# Patient Record
(demographics unavailable — no encounter records)

---

## 2025-02-11 NOTE — PHYSICAL EXAM
[Obese, well nourished, in no acute distress] : obese, well nourished, in no acute distress [Normal] : affect appropriate [de-identified] : obese, soft, nontender, no evidence of hernia or diastases

## 2025-02-11 NOTE — ASSESSMENT
[FreeTextEntry1] : 24 year old F with a longstanding history of obesity presents for initial consultation for weight-loss management.  I had a lengthy discussion with the patient regarding nutrition, exercise, weight loss medications, and bariatric surgery. The patient does not qualify for bariatric surgery, We discuss how bariatric surgery may offer greater weight loss results with better long-term success. Patient qualifies for and is currently most interested in weight loss medications.   Health maintenance and behavioral/nutrition counseling for obesity: An additional 30 minutes of the visit was spent counseling the patient regarding need for aggressive weight loss and behavior modification including nutrition and proper eating habits, including which foods to eat and avoid.   I emphasized the importance of making healthier food choices including fresh fruits and vegetables, lean meats, and protein sources. I recommended front loading calories, incorporating whole grains, and eliminating fast foods. I also discussed the importance of avoiding fried/fatty foods and foods containing high sugar content including juices/shakes/sodas/desserts. Preprinted protein source information list given to the patient.   I also encouraged beginning an exercise program and recommended cardiovascular exercises along with strength training to build lean muscle. I made suggestions on different types of exercises to try.   Patient will work on the following: -Patient seen by nutritionist Bianca Simpson at today's visit, 15 minutes -Focus on eating 3 well-balanced meals during the daytime with appropriate portion size -Increase zero-calorie liquids 64 ounces a day -Cooking fresh meals rather than take out/processed/ready-made foods -Incorporating exercise; walk 8-10k steps daily and strength training -Obtain recent bloodwork   I have discussed initiating Zepbound therapy for pt. All risks and benefits have been discussed with the patient. Currently there are no contraindications for the use of ZEPBOUND after reviewing pts medical history and labs including personal or family history of thyroid cancer or MEN2. Possible side effects were discussed with the patient including nausea, reflux, constipation, delayed gastric emptying, gallstones, kidney stones or pancreatitis.  Decreased efficacy of oral contraceptives was also discussed with Zepbound.   Pt verbalizes understanding and wishes to proceed with medical therapy. Start Zepbound based on labs and authorization. Patient educated to call with questions/concerns. All questions answered.   Return to office 1 month after starting Zepbound    Discussed with the pt of the warnings of pregnancy while on Zepbound: -Patient not currently sexually active -Explained weight loss can increase fertility - instructed pt to avoid planned pregnancy and use of contraception - advised pt to stop immediately if becomes pregnant Pt verbalized understanding of the above   Additional time spent before and after visit reviewing chart.

## 2025-02-11 NOTE — HISTORY OF PRESENT ILLNESS
[de-identified] : 24 year old F with a longstanding history of obesity presents for initial consultation for weight-loss management. Pt feels that she eats healthy and goes to the gym but is unable to lose weight has history of PCOS.  Is now more frequent but not totally regular.  Pt has tried various diets and exercise programs with limited long-term success.  Pt is interested in starting weight loss medications.   Heaviest/current wt185/183 lbs, Goal weight: 130 lbs Diets/exercise programs tried in the past:   Current dietary lifestyle Breakfast: protein shake(skipped breakfast in the past) Lunch: Protein bar, Greek yogurt, yogurt shake Dinner: Protein, veggies, rice Snacks: not snacker Drinks: water 40 ounces, 1 coffee with milk   Activity Lifestyle Sleep: 6-8 hours Physical activity/exercise: walks and goes to gym Work: teacher Smoking/ETOH: no   Females of Childbearing Age Plans for future pregnancy: no plans Form(s) of Contraception: not currently sexually active    Last Colonoscopy: n/a Last Mammogram: n/a Last Pap Smear: n/a    Weight Loss Medication Screening: Reports no glaucoma, history of eating disorder, anxiety, substance abuse, uncontrolled blood pressure, kidney stones, or pancreatitis. Reports no family history thyroid cancer or MEN 2 syndrome. History of bariatric surgery: no Obesity comorbidities: none Comorbidities improved/resolved: n/a Anti-obesity medication: none Obesity medication side effects: n/a

## 2025-03-27 NOTE — PHYSICAL EXAM
[Obese, well nourished, in no acute distress] : obese, well nourished, in no acute distress [Normal] : affect appropriate [de-identified] : Equal chest rise, non-labored respirations, no audible wheezing.

## 2025-03-27 NOTE — HISTORY OF PRESENT ILLNESS
[de-identified] : 24 year old F with a longstanding history of obesity presents for a weight-loss medication follow-up after having made nutrition and exercise changes since last visit. Weight stable since last visit. Pt started Zepbound 2.5 mg on 3/8/25. Reports she does not feel any appetite suppression. Reports no abdominal pain, nausea/vomiting, constipation, diarrhea, reflux/heartburn.   Patient trying to eat 3 protein-rich meals/day, some snacking on bananas and oranges, drinking adequate zero-calorie fluids/day, and exercising by walking and cycling 3-4x per week; sleeps 6-8 hrs/night. Pt is interested in increasing dosage of medication   Starting weight at initial consult: 183 lbs Current weight at today's visit: 183 lbs   Anti-obesity medication(s): Zepbound Start date: 3/8/25 Current dose: 2.5 mg Side-effects from anti-obesity medication(s): none Obesity comorbidities: PCOS Comorbidities improved/resolved: n/a History of bariatric surgery: no

## 2025-03-27 NOTE — PHYSICAL EXAM
[Obese, well nourished, in no acute distress] : obese, well nourished, in no acute distress [Normal] : affect appropriate [de-identified] : Equal chest rise, non-labored respirations, no audible wheezing.

## 2025-03-27 NOTE — ASSESSMENT
[FreeTextEntry1] : 24 year old F with a longstanding history of obesity presents for a weight-loss medication follow-up after having made nutrition and exercise changes since last visit. Weight stable since last visit. Pt started Zepbound 2.5 mg on 3/8/25. Reports she does not feel any appetite suppression.   Last blood work 2/2025 Next blood work 8/2025   Had a lengthy discussion with the patient regarding nutrition, exercise, weight loss medications.   Patient will work on the following: -Meet with nutritionist and follow up with nutrition classes -Eliminate snacks -Increase zero calorie fluid intake to at least 64 oz/day -Focus on eating 3 well-balanced meals during the daytime with appropriate portion size -Cooking fresh meals rather than take out/processed/ready-made foods -Incorporating exercise; walk 8-10k steps daily -Increase dosage of Zepbound to 5mg/week   Discussed with the pt of the warnings of pregnancy while on Zepbound. Pt reports she is not sexually active. Discussed using condoms if she does become sexually active and that Zepbound decreases the efficacy of OCPs.  - instructed pt to avoid planned pregnancy and use of contraception - advised pt to stop immediately if becomes pregnant   Pt verbalized understanding of the above Pt will call office immediately for any side effects. Pt verbalizes understanding and wishes to proceed with medical therapy. Patient educated to call with questions/concerns. All questions answered.   Return to office 6-8 weeks or sooner if needed   Additional time spent before and after visit reviewing chart.

## 2025-03-27 NOTE — HISTORY OF PRESENT ILLNESS
[de-identified] : 24 year old F with a longstanding history of obesity presents for a weight-loss medication follow-up after having made nutrition and exercise changes since last visit. Weight stable since last visit. Pt started Zepbound 2.5 mg on 3/8/25. Reports she does not feel any appetite suppression. Reports no abdominal pain, nausea/vomiting, constipation, diarrhea, reflux/heartburn.   Patient trying to eat 3 protein-rich meals/day, some snacking on bananas and oranges, drinking adequate zero-calorie fluids/day, and exercising by walking and cycling 3-4x per week; sleeps 6-8 hrs/night. Pt is interested in increasing dosage of medication   Starting weight at initial consult: 183 lbs Current weight at today's visit: 183 lbs   Anti-obesity medication(s): Zepbound Start date: 3/8/25 Current dose: 2.5 mg Side-effects from anti-obesity medication(s): none Obesity comorbidities: PCOS Comorbidities improved/resolved: n/a History of bariatric surgery: no

## 2025-06-03 NOTE — PHYSICAL EXAM
[Obese, well nourished, in no acute distress] : obese, well nourished, in no acute distress [Normal] : affect appropriate [de-identified] : Equal chest rise, non-labored respirations, no audible wheezing.

## 2025-06-03 NOTE — PHYSICAL EXAM
[Obese, well nourished, in no acute distress] : obese, well nourished, in no acute distress [Normal] : affect appropriate [de-identified] : Equal chest rise, non-labored respirations, no audible wheezing.

## 2025-06-03 NOTE — HISTORY OF PRESENT ILLNESS
[de-identified] : 25 year old F with a longstanding history of obesity presents for a weight-loss medication follow-up after having made nutrition and exercise changes since last visit. Weight loss 11 lbs since last visit; currently on Zepbound 5mg. Reports she does not feel much appetite suppression and requesting to increase dose. Reports no abdominal pain, nausea/vomiting, constipation, diarrhea, reflux/heartburn, vision changes, mood changes. Pt reports her menstrual cycle has been irregular, but she has PCOS and it is usually irregular. She reports she is not sexually active at this time.   Patient trying to eat 3 protein-rich meals/day, some snacking on bananas and oranges, drinking adequate zero-calorie fluids/day, and exercising by walking and cycling/treadmill 3x per week; sleeps 6-8 hrs/night.   Starting weight at initial consult: 183 lbs Current weight at today's visit: 172 lbs   Anti-obesity medication(s): Zepbound Start date: 3/8/25 Current dose: 5 mg Side-effects from anti-obesity medication(s): none Obesity comorbidities: PCOS Comorbidities improved/resolved: n/a History of bariatric surgery: no

## 2025-06-03 NOTE — HISTORY OF PRESENT ILLNESS
[de-identified] : 25 year old F with a longstanding history of obesity presents for a weight-loss medication follow-up after having made nutrition and exercise changes since last visit. Weight loss 11 lbs since last visit; currently on Zepbound 5mg. Reports she does not feel much appetite suppression and requesting to increase dose. Reports no abdominal pain, nausea/vomiting, constipation, diarrhea, reflux/heartburn, vision changes, mood changes. Pt reports her menstrual cycle has been irregular, but she has PCOS and it is usually irregular. She reports she is not sexually active at this time.   Patient trying to eat 3 protein-rich meals/day, some snacking on bananas and oranges, drinking adequate zero-calorie fluids/day, and exercising by walking and cycling/treadmill 3x per week; sleeps 6-8 hrs/night.   Starting weight at initial consult: 183 lbs Current weight at today's visit: 172 lbs   Anti-obesity medication(s): Zepbound Start date: 3/8/25 Current dose: 5 mg Side-effects from anti-obesity medication(s): none Obesity comorbidities: PCOS Comorbidities improved/resolved: n/a History of bariatric surgery: no

## 2025-06-03 NOTE — ASSESSMENT
[FreeTextEntry1] : 25 year old F with a longstanding history of obesity presents for a weight-loss medication follow-up after having made nutrition and exercise changes since last visit. Weight loss 11 lbs since last visit; currently on Zepbound 5mg. Reports she does not feel much appetite suppression and requesting to increase dose.  Last blood work 2/2025 Next blood work 8/2025   Had a lengthy discussion with the patient regarding nutrition, exercise, weight loss medications.   Patient will work on the following: -Meet with nutritionist and follow up with nutrition classes -Eliminate snacks -Increase zero calorie fluid intake to at least 64 oz/day -Focus on eating 3 well-balanced meals during the daytime with appropriate portion size -Cooking fresh meals rather than take out/processed/ready-made foods -Incorporating exercise; walk 8-10k steps daily -Increase dosage of Zepbound to 7.5mg/week -Complete blood work prior to next appt   Discussed with the pt of the warnings of pregnancy while on Zepbound. Pt reports she is not sexually active. Discussed using condoms if she does become sexually active and that Zepbound decreases the efficacy of OCPs.  - instructed pt to avoid planned pregnancy and use of contraception - advised pt to stop immediately if becomes pregnant   Pt verbalized understanding of the above Pt will call office immediately for any side effects. Pt verbalizes understanding and wishes to proceed with medical therapy. Patient educated to call with questions/concerns. All questions answered.   Return to office 6-8 weeks or sooner if needed   Additional time spent before and after visit reviewing chart.